# Patient Record
Sex: FEMALE | Race: WHITE | NOT HISPANIC OR LATINO
[De-identification: names, ages, dates, MRNs, and addresses within clinical notes are randomized per-mention and may not be internally consistent; named-entity substitution may affect disease eponyms.]

---

## 2018-08-08 ENCOUNTER — TRANSCRIPTION ENCOUNTER (OUTPATIENT)
Age: 73
End: 2018-08-08

## 2021-09-21 PROBLEM — Z00.00 ENCOUNTER FOR PREVENTIVE HEALTH EXAMINATION: Status: ACTIVE | Noted: 2021-09-21

## 2021-09-30 DIAGNOSIS — Z80.1 FAMILY HISTORY OF MALIGNANT NEOPLASM OF TRACHEA, BRONCHUS AND LUNG: ICD-10-CM

## 2021-09-30 DIAGNOSIS — Z78.9 OTHER SPECIFIED HEALTH STATUS: ICD-10-CM

## 2021-09-30 DIAGNOSIS — Z80.3 FAMILY HISTORY OF MALIGNANT NEOPLASM OF BREAST: ICD-10-CM

## 2021-09-30 DIAGNOSIS — Z80.0 FAMILY HISTORY OF MALIGNANT NEOPLASM OF DIGESTIVE ORGANS: ICD-10-CM

## 2021-10-04 ENCOUNTER — APPOINTMENT (OUTPATIENT)
Dept: BREAST CENTER | Facility: CLINIC | Age: 76
End: 2021-10-04
Payer: MEDICARE

## 2021-10-04 VITALS — BODY MASS INDEX: 19.26 KG/M2 | WEIGHT: 102 LBS | HEIGHT: 61 IN

## 2021-10-04 DIAGNOSIS — Z98.890 OTHER SPECIFIED POSTPROCEDURAL STATES: ICD-10-CM

## 2021-10-04 PROCEDURE — 99203 OFFICE O/P NEW LOW 30 MIN: CPT

## 2021-10-04 NOTE — REASON FOR VISIT
[Initial Eval - Existing Diagnosis] : an initial evaluation of an existing diagnosis [FreeTextEntry1] : The patient comes in with a family history of breast cancer and with Ashkenazi Taoism heritage.  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  Margins were free and a MammaPrint showed a high risk luminal type but it was felt that no chemotherapy was needed given her age and she received external beam radiation but refused endocrine therapy despite recommendations.  She then developed a right breast 12:00 density that went an ultrasound core biopsy in September 2016 which is showed stromal fibrosis and fat necrosis.  She was lost to follow-up and comes in now to reestablish care.

## 2021-10-04 NOTE — PHYSICAL EXAM
[Normocephalic] : normocephalic [Atraumatic] : atraumatic [EOMI] : extra ocular movement intact [Supple] : supple [No Supraclavicular Adenopathy] : no supraclavicular adenopathy [No Cervical Adenopathy] : no cervical adenopathy [Examined in the supine and seated position] : examined in the supine and seated position [No dominant masses] : no dominant masses in right breast  [No dominant masses] : no dominant masses left breast [No Nipple Retraction] : no left nipple retraction [No Nipple Discharge] : no left nipple discharge [Breast Mass Right Breast ___cm] : no masses [Breast Mass Left Breast ___cm] : no masses [Breast Nipple Inversion] : nipples not inverted [Breast Nipple Retraction] : nipples not retracted [Breast Nipple Flattening] : nipples not flattened [Breast Nipple Fissures] : nipples not fissured [Breast Abnormal Lactation (Galactorrhea)] : no galactorrhea [Breast Abnormal Secretion Serous Fluid] : no serous discharge [Breast Abnormal Secretion Bloody Fluid] : no bloody discharge [Breast Abnormal Secretion Opalescent Fluid] : no milky discharge [No Axillary Lymphadenopathy] : no left axillary lymphadenopathy [No Edema] : no edema [No Rashes] : no rashes [No Ulceration] : no ulceration [de-identified] : On exam, the patient has ptotic B-cup breasts.  She does have an indentation in the 12:00 region of the right breast where she had her prior wide excision.  She has some scarring changes and thickening which is unchanged.  She has no evidence of recurrence.  She had a core needle biopsy performed of this area in September 2016 which showed stromal fibrosis and fat necrosis.  She has no axillary, supraclavicular, or cervical adenopathy. [de-identified] : Status post right breast partial mastectomy with scarring changes and some cosmetic retraction but no evidence of recurrence.

## 2021-10-04 NOTE — HISTORY OF PRESENT ILLNESS
[FreeTextEntry1] : The patient is a 76-year-old G2, P2 postmenopausal white female of Ashkenazi Hinduism descent.  She underwent menarche at age 11 and had her first child at age 18.  She has a family history with her maternal aunt with breast cancer in her 40s.  Her mother and maternal great aunt had breast cancer.  Her paternal uncle  secondary to colon cancer and her father had lung cancer.  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  The patient underwent a MammaPrint and had a high risk luminal B subtype.  It was felt that no chemotherapy was needed given her age and she received radiation therapy through Dr. Martinez at [facility] and refused all endocrine therapy despite our recommendations.  She was lost to follow-up but then found a new palpable mass directly underneath the wide excision site in the 12:00 region of the right breast which was confirmed on ultrasound and ultrasound-guided core biopsy on 2016 just showed stromal fibrosis and fat necrosis.  She was lost to follow-up again and comes in now to reestablish care.

## 2021-10-04 NOTE — ASSESSMENT
[FreeTextEntry1] : The patient is a 76-year-old G2, P2 postmenopausal white female of Ashkenazi Pentecostal descent.  She underwent menarche at age 11 and had her first child at age 18.  She has a family history with her maternal aunt with breast cancer in her 40s.  Her mother and maternal great aunt had breast cancer.  Her paternal uncle  secondary to colon cancer and her father had lung cancer.  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  The patient underwent a MammaPrint and had a high risk luminal B subtype.  It was felt that no chemotherapy was needed given her age and she received radiation therapy through Dr. Martinez at [facility] and refused all endocrine therapy despite our recommendations.  She was lost to follow-up but then found a new palpable mass directly underneath the wide excision site in the 12:00 region of the right breast which was confirmed on ultrasound and ultrasound-guided core biopsy on 2016 just showed stromal fibrosis and fat necrosis.  Her last bilateral mammography and ultrasound was reviewed from 2020 which showed no suspicious findings and just postop changes.  On exam today, I cannot feel any evidence of recurrence in the right breast with only chronic scarring changes and no suspicious findings in the left breast.  The patient was reassured and should follow-up again in 1 year in 2022.  Her next bilateral mammography and ultrasound will be due at the end of 2021 and she was given prescriptions.

## 2022-10-24 NOTE — ASSESSMENT
[FreeTextEntry1] : The patient is a 77-year-old G2, P2 postmenopausal white female of Ashkenazi Yazidi descent.  She underwent menarche at age 11 and had her first child at age 18.  She has a family history with her maternal aunt with breast cancer in her 40s.  Her mother and maternal great aunt had breast cancer.  Her paternal uncle  secondary to colon cancer and her father had lung cancer.  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  The patient underwent a MammaPrint and had a high risk luminal B subtype.  It was felt that no chemotherapy was needed given her age and she received radiation therapy through Dr. Martinez at [facility] and refused all endocrine therapy despite our recommendations.  She was lost to follow-up but then found a new palpable mass directly underneath the wide excision site in the 12:00 region of the right breast which was confirmed on ultrasound and ultrasound-guided core biopsy on 2016 just showed stromal fibrosis and fat necrosis.  Her last bilateral mammography was reviewed from ?????? 2021 which showed no suspicious findings and just postop changes in the right breast.  On exam today, I cannot feel any evidence of recurrence in the right breast with only chronic scarring changes and no suspicious findings in the left breast.  The patient was reassured and should follow-up again in 1 year in 2023.  Her next bilateral mammography will be due in ???????? and she was given prescriptions.

## 2022-10-24 NOTE — HISTORY OF PRESENT ILLNESS
[FreeTextEntry1] : The patient is a 77-year-old G2, P2 postmenopausal white female of Ashkenazi Cheondoism descent.  She underwent menarche at age 11 and had her first child at age 18.  She has a family history with her maternal aunt with breast cancer in her 40s.  Her mother and maternal great aunt had breast cancer.  Her paternal uncle  secondary to colon cancer and her father had lung cancer.  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  The patient underwent a MammaPrint and had a high risk luminal B subtype.  It was felt that no chemotherapy was needed given her age and she received radiation therapy through Dr. Martinez at [facility] and refused all endocrine therapy despite our recommendations.  She was lost to follow-up but then found a new palpable mass directly underneath the wide excision site in the 12:00 region of the right breast which was confirmed on ultrasound and ultrasound-guided core biopsy on 2016 just showed stromal fibrosis and fat necrosis.  She comes in now for yearly follow-up and gets yearly mammography and ultrasound.

## 2022-10-24 NOTE — PHYSICAL EXAM
[Normocephalic] : normocephalic [Atraumatic] : atraumatic [EOMI] : extra ocular movement intact [Supple] : supple [No Supraclavicular Adenopathy] : no supraclavicular adenopathy [No Cervical Adenopathy] : no cervical adenopathy [Examined in the supine and seated position] : examined in the supine and seated position [No dominant masses] : no dominant masses in right breast  [No dominant masses] : no dominant masses left breast [No Nipple Retraction] : no left nipple retraction [No Nipple Discharge] : no left nipple discharge [Breast Mass Right Breast ___cm] : no masses [Breast Mass Left Breast ___cm] : no masses [Breast Nipple Inversion] : nipples not inverted [Breast Nipple Retraction] : nipples not retracted [Breast Nipple Flattening] : nipples not flattened [Breast Nipple Fissures] : nipples not fissured [Breast Abnormal Lactation (Galactorrhea)] : no galactorrhea [Breast Abnormal Secretion Bloody Fluid] : no bloody discharge [Breast Abnormal Secretion Serous Fluid] : no serous discharge [Breast Abnormal Secretion Opalescent Fluid] : no milky discharge [No Axillary Lymphadenopathy] : no left axillary lymphadenopathy [No Edema] : no edema [No Rashes] : no rashes [No Ulceration] : no ulceration [de-identified] : On exam, the patient has ptotic B-cup breasts.  She does have an indentation in the 12:00 region of the right breast where she had her prior wide excision.  She has some scarring changes and thickening which is unchanged.  She has no evidence of recurrence.  She had a core needle biopsy performed of this area in September 2016 which showed stromal fibrosis and fat necrosis.  She has no axillary, supraclavicular, or cervical adenopathy. [de-identified] : Status post right breast partial mastectomy with scarring changes and some cosmetic retraction but no evidence of recurrence.

## 2022-10-24 NOTE — REASON FOR VISIT
[Follow-Up: _____] : a [unfilled] follow-up visit [FreeTextEntry1] : The patient comes in with a family history of breast cancer and with Ashkenazi Jehovah's witness heritage.  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  Margins were free and a MammaPrint showed a high risk luminal type but it was felt that no chemotherapy was needed given her age and she received external beam radiation but refused endocrine therapy despite recommendations.  She then developed a right breast 12:00 density that went an ultrasound core biopsy in September 2016 which is showed stromal fibrosis and fat necrosis.  She comes in now for yearly follow-up.

## 2022-10-31 ENCOUNTER — APPOINTMENT (OUTPATIENT)
Dept: BREAST CENTER | Facility: CLINIC | Age: 77
End: 2022-10-31

## 2022-10-31 DIAGNOSIS — Z80.3 FAMILY HISTORY OF MALIGNANT NEOPLASM OF BREAST: ICD-10-CM

## 2022-10-31 DIAGNOSIS — Z90.11 ACQUIRED ABSENCE OF RIGHT BREAST AND NIPPLE: ICD-10-CM

## 2022-10-31 DIAGNOSIS — Z85.3 PERSONAL HISTORY OF MALIGNANT NEOPLASM OF BREAST: ICD-10-CM

## 2022-10-31 PROCEDURE — 99213 OFFICE O/P EST LOW 20 MIN: CPT

## 2022-10-31 NOTE — HISTORY OF PRESENT ILLNESS
[FreeTextEntry1] : The patient is a 77-year-old G2, P2 postmenopausal white female of Ashkenazi Synagogue descent.  She underwent menarche at age 11 and had her first child at age 18.  She has a family history with her maternal aunt with breast cancer in her 40s.  Her mother and maternal great aunt had breast cancer.  Her paternal uncle  secondary to colon cancer and her father had lung cancer.  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  The patient underwent a MammaPrint and had a high risk luminal B subtype.  It was felt that no chemotherapy was needed given her age and she received radiation therapy through Dr. Martinez at [facility] and refused all endocrine therapy despite our recommendations.  She was lost to follow-up but then found a new palpable mass directly underneath the wide excision site in the 12:00 region of the right breast which was confirmed on ultrasound and ultrasound-guided core biopsy on 2016 just showed stromal fibrosis and fat necrosis.  She comes in now for yearly follow-up and gets yearly mammography and ultrasound.

## 2022-10-31 NOTE — REASON FOR VISIT
[Follow-Up: _____] : a [unfilled] follow-up visit [FreeTextEntry1] : The patient comes in with a family history of breast cancer and with Ashkenazi Buddhist heritage.  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  Margins were free and a MammaPrint showed a high risk luminal type but it was felt that no chemotherapy was needed given her age and she received external beam radiation but refused endocrine therapy despite recommendations.  She then developed a right breast 12:00 density that went an ultrasound core biopsy in September 2016 which is showed stromal fibrosis and fat necrosis.  She comes in now for yearly follow-up.

## 2022-10-31 NOTE — PHYSICAL EXAM
[Normocephalic] : normocephalic [Atraumatic] : atraumatic [EOMI] : extra ocular movement intact [Supple] : supple [No Supraclavicular Adenopathy] : no supraclavicular adenopathy [No Cervical Adenopathy] : no cervical adenopathy [Examined in the supine and seated position] : examined in the supine and seated position [No dominant masses] : no dominant masses in right breast  [No dominant masses] : no dominant masses left breast [No Nipple Retraction] : no left nipple retraction [No Nipple Discharge] : no left nipple discharge [Breast Mass Right Breast ___cm] : no masses [Breast Mass Left Breast ___cm] : no masses [No Axillary Lymphadenopathy] : no left axillary lymphadenopathy [No Edema] : no edema [No Rashes] : no rashes [No Ulceration] : no ulceration [Breast Nipple Inversion] : nipples not inverted [Breast Nipple Retraction] : nipples not retracted [Breast Nipple Flattening] : nipples not flattened [Breast Nipple Fissures] : nipples not fissured [Breast Abnormal Lactation (Galactorrhea)] : no galactorrhea [Breast Abnormal Secretion Bloody Fluid] : no bloody discharge [Breast Abnormal Secretion Serous Fluid] : no serous discharge [Breast Abnormal Secretion Opalescent Fluid] : no milky discharge [de-identified] : On exam, the patient has ptotic B-cup breasts.  She does have an indentation in the 12:00 region of the right breast where she had her prior wide excision.  She has some scarring changes and thickening which is unchanged.  She has no evidence of recurrence.  She had a core needle biopsy performed of this area in September 2016 which showed stromal fibrosis and fat necrosis.  She has no axillary, supraclavicular, or cervical adenopathy. [de-identified] : Status post right breast partial mastectomy with scarring changes and some cosmetic retraction but no evidence of recurrence.

## 2022-10-31 NOTE — ASSESSMENT
[FreeTextEntry1] : The patient is a 77-year-old G2, P2 postmenopausal white female of Ashkenazi Cheondoism descent.  She underwent menarche at age 11 and had her first child at age 18.  She has a family history with her maternal aunt with breast cancer in her 40s.  Her mother and maternal great aunt had breast cancer.  Her paternal uncle  secondary to colon cancer and her father had lung cancer.  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  The patient underwent a MammaPrint and had a high risk luminal B subtype.  It was felt that no chemotherapy was needed given her age and she received radiation therapy through Dr. Martinez at [facility] and refused all endocrine therapy despite our recommendations.  She was lost to follow-up but then found a new palpable mass directly underneath the wide excision site in the 12:00 region of the right breast which was confirmed on ultrasound and ultrasound-guided core biopsy on 2016 just showed stromal fibrosis and fat necrosis.  Her last bilateral mammography was reviewed from 2022 performed at [place] which showed no suspicious findings and just postop changes with dystrophic calcifications in the right breast but I am awaiting the official radiologist report.  On exam today, I cannot feel any evidence of recurrence in the right breast with only chronic scarring changes and no suspicious findings in the left breast.  The patient was reassured and as long as the official reading comes back is unchanged, she should follow-up again in 1 year in 2023.  Her next bilateral mammography will be due in 2023 and she was given prescriptions.

## 2023-10-31 ENCOUNTER — NON-APPOINTMENT (OUTPATIENT)
Age: 78
End: 2023-10-31

## 2023-11-10 ENCOUNTER — APPOINTMENT (OUTPATIENT)
Dept: BREAST CENTER | Facility: CLINIC | Age: 78
End: 2023-11-10
Payer: MEDICARE

## 2023-11-10 PROCEDURE — 99213 OFFICE O/P EST LOW 20 MIN: CPT

## 2024-10-19 ENCOUNTER — NON-APPOINTMENT (OUTPATIENT)
Age: 79
End: 2024-10-19

## 2024-11-07 DIAGNOSIS — Z12.31 ENCOUNTER FOR SCREENING MAMMOGRAM FOR MALIGNANT NEOPLASM OF BREAST: ICD-10-CM

## 2024-11-08 ENCOUNTER — APPOINTMENT (OUTPATIENT)
Dept: BREAST CENTER | Facility: CLINIC | Age: 79
End: 2024-11-08
Payer: MEDICARE

## 2024-11-08 DIAGNOSIS — Z85.3 PERSONAL HISTORY OF MALIGNANT NEOPLASM OF BREAST: ICD-10-CM

## 2024-11-08 DIAGNOSIS — Z12.39 ENCOUNTER FOR OTHER SCREENING FOR MALIGNANT NEOPLASM OF BREAST: ICD-10-CM

## 2024-11-08 DIAGNOSIS — Z90.11 ACQUIRED ABSENCE OF RIGHT BREAST AND NIPPLE: ICD-10-CM

## 2024-11-08 DIAGNOSIS — Z80.3 FAMILY HISTORY OF MALIGNANT NEOPLASM OF BREAST: ICD-10-CM

## 2024-11-08 PROCEDURE — 99213 OFFICE O/P EST LOW 20 MIN: CPT
